# Patient Record
Sex: MALE | Race: WHITE | NOT HISPANIC OR LATINO | URBAN - METROPOLITAN AREA
[De-identification: names, ages, dates, MRNs, and addresses within clinical notes are randomized per-mention and may not be internally consistent; named-entity substitution may affect disease eponyms.]

---

## 2024-05-07 ENCOUNTER — OFFICE VISIT (OUTPATIENT)
Dept: URGENT CARE | Facility: CLINIC | Age: 64
End: 2024-05-07
Payer: COMMERCIAL

## 2024-05-07 VITALS
DIASTOLIC BLOOD PRESSURE: 75 MMHG | OXYGEN SATURATION: 95 % | WEIGHT: 200 LBS | TEMPERATURE: 99.8 F | HEART RATE: 108 BPM | SYSTOLIC BLOOD PRESSURE: 149 MMHG | RESPIRATION RATE: 12 BRPM

## 2024-05-07 DIAGNOSIS — U07.1 COVID-19: ICD-10-CM

## 2024-05-07 DIAGNOSIS — R05.9 COUGH IN ADULT: Primary | ICD-10-CM

## 2024-05-07 LAB
POC TRIPLEX FLU A-AG: ABNORMAL
POC TRIPLEX FLU B-AG: ABNORMAL
POC TRIPLEX SARSCOV-2 AG: ABNORMAL

## 2024-05-07 PROCEDURE — 87428 SARSCOV & INF VIR A&B AG IA: CPT | Performed by: PHYSICIAN ASSISTANT

## 2024-05-07 PROCEDURE — 99203 OFFICE O/P NEW LOW 30 MIN: CPT | Performed by: PHYSICIAN ASSISTANT

## 2024-05-07 RX ORDER — BENZONATATE 200 MG/1
200 CAPSULE ORAL 3 TIMES DAILY PRN
Qty: 20 CAPSULE | Refills: 0 | Status: SHIPPED | OUTPATIENT
Start: 2024-05-07 | End: 2024-05-14

## 2024-05-07 ASSESSMENT — ENCOUNTER SYMPTOMS
COUGH: 1
NEUROLOGICAL NEGATIVE: 1
SORE THROAT: 0
FEVER: 1
SHORTNESS OF BREATH: 1
HEMATOLOGIC/LYMPHATIC NEGATIVE: 1
PSYCHIATRIC NEGATIVE: 1
ALLERGIC/IMMUNOLOGIC NEGATIVE: 1
EYES NEGATIVE: 1
ENDOCRINE NEGATIVE: 1
MUSCULOSKELETAL NEGATIVE: 1
CARDIOVASCULAR NEGATIVE: 1
GASTROINTESTINAL NEGATIVE: 1

## 2024-05-07 ASSESSMENT — PAIN SCALES - GENERAL: PAINLEVEL: 4

## 2024-05-07 NOTE — LETTER
May 7, 2024     Patient: Kareem Flores   YOB: 1960   Date of Visit: 5/7/2024       To Whom it May Concern:    Kareem Flores was seen in my clinic on 5/7/2024. He can return to work on 5/10/2024.    If you have any questions or concerns, please don't hesitate to call.         Sincerely,          Dee Campa PA-C        CC: No Recipients

## 2024-05-07 NOTE — PROGRESS NOTES
Subjective   Patient ID: Kareem Flores is a 63 y.o. male.      History provided by:  Patient   used: No    Cough  Associated symptoms include a fever and shortness of breath. Pertinent negatives include no ear pain or sore throat.   This is a 63 yr old male here for respiratory sxs. Low grade fever, dry cough, dyspnea and clear nasal congestion x 1 day. No sore throat or ear pain. COVID exposure.     Review of Systems   Constitutional:  Positive for fever.   HENT:  Positive for congestion. Negative for ear pain and sore throat.    Eyes: Negative.    Respiratory:  Positive for cough and shortness of breath.    Cardiovascular: Negative.    Gastrointestinal: Negative.    Endocrine: Negative.    Genitourinary: Negative.    Musculoskeletal: Negative.    Skin: Negative.    Allergic/Immunologic: Negative.    Neurological: Negative.    Hematological: Negative.    Psychiatric/Behavioral: Negative.     All other systems reviewed and are negative.  /75   Pulse 108   Temp 37.7 °C (99.8 °F)   Resp 12   Wt 90.7 kg (200 lb)   SpO2 95%     Objective   Physical Exam  Vitals and nursing note reviewed.   Constitutional:       Appearance: Normal appearance.   HENT:      Head: Normocephalic and atraumatic.      Right Ear: Tympanic membrane and ear canal normal.      Left Ear: Tympanic membrane and ear canal normal.      Mouth/Throat:      Mouth: Mucous membranes are moist.      Pharynx: Oropharynx is clear.   Cardiovascular:      Rate and Rhythm: Normal rate and regular rhythm.   Pulmonary:      Effort: Pulmonary effort is normal.      Breath sounds: Normal breath sounds.   Musculoskeletal:      Cervical back: Neck supple.   Lymphadenopathy:      Cervical: No cervical adenopathy.   Skin:     General: Skin is warm and dry.   Neurological:      General: No focal deficit present.      Mental Status: He is alert and oriented to person, place, and time.   Psychiatric:         Mood and Affect: Mood normal.          Behavior: Behavior normal.     Rapid COVID/flu-COVID positive, flu negative    Assessment:  COVID 19    Plan:  Tessalon perles 200 mg tid prn cough,   Home quarantine while febrile, then 5 days of mask wearing  Call pcp if want COVID antivral rx  ER visit anytime 24/7 for acute worsening or changing condition

## 2024-05-07 NOTE — PATIENT INSTRUCTIONS
Self quarantine for a few days till no fever  Fluids and rest  Talk to pcp if want COVID antiviral rx, have 5 days from start of symptoms to start medication  ER visit anytime 24/7 for acute worsening or changing condition